# Patient Record
Sex: FEMALE | Race: WHITE | URBAN - METROPOLITAN AREA
[De-identification: names, ages, dates, MRNs, and addresses within clinical notes are randomized per-mention and may not be internally consistent; named-entity substitution may affect disease eponyms.]

---

## 2017-09-24 ENCOUNTER — HOSPITAL ENCOUNTER (EMERGENCY)
Facility: CLINIC | Age: 57
Discharge: HOME | End: 2017-09-24
Attending: EMERGENCY MEDICINE | Admitting: EMERGENCY MEDICINE
Payer: COMMERCIAL

## 2017-09-24 ENCOUNTER — APPOINTMENT (OUTPATIENT)
Dept: GENERAL RADIOLOGY | Facility: CLINIC | Age: 57
End: 2017-09-24
Attending: EMERGENCY MEDICINE
Payer: COMMERCIAL

## 2017-09-24 VITALS
RESPIRATION RATE: 16 BRPM | BODY MASS INDEX: 20.4 KG/M2 | DIASTOLIC BLOOD PRESSURE: 87 MMHG | HEART RATE: 88 BPM | HEIGHT: 67 IN | SYSTOLIC BLOOD PRESSURE: 132 MMHG | WEIGHT: 130 LBS | TEMPERATURE: 97.8 F | OXYGEN SATURATION: 99 %

## 2017-09-24 DIAGNOSIS — S42.292A CLOSED FRACTURE OF HEAD OF LEFT HUMERUS, INITIAL ENCOUNTER: ICD-10-CM
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
## 2017-09-24 DIAGNOSIS — S42.212A: ICD-10-CM

## 2017-09-24 LAB
ALBUMIN SERPL-MCNC: 4.1 G/DL (ref 3.4–5)
ALP SERPL-CCNC: 43 U/L (ref 40–150)
ALT SERPL W P-5'-P-CCNC: 20 U/L (ref 0–50)
ANION GAP SERPL CALCULATED.3IONS-SCNC: 14 MMOL/L (ref 3–14)
AST SERPL W P-5'-P-CCNC: 24 U/L (ref 0–45)
BASOPHILS # BLD AUTO: 0 10E9/L (ref 0–0.2)
BASOPHILS NFR BLD AUTO: 0.2 %
BILIRUB SERPL-MCNC: 0.3 MG/DL (ref 0.2–1.3)
BUN SERPL-MCNC: 8 MG/DL (ref 7–30)
CALCIUM SERPL-MCNC: 8.7 MG/DL (ref 8.5–10.1)
CHLORIDE SERPL-SCNC: 104 MMOL/L (ref 94–109)
CO2 SERPL-SCNC: 23 MMOL/L (ref 20–32)
CREAT SERPL-MCNC: 0.5 MG/DL (ref 0.52–1.04)
DIFFERENTIAL METHOD BLD: ABNORMAL
EOSINOPHIL # BLD AUTO: 0 10E9/L (ref 0–0.7)
EOSINOPHIL NFR BLD AUTO: 0.2 %
ERYTHROCYTE [DISTWIDTH] IN BLOOD BY AUTOMATED COUNT: 12 % (ref 10–15)
ETHANOL SERPL-MCNC: 0.07 G/DL
GFR SERPL CREATININE-BSD FRML MDRD: >90 ML/MIN/1.7M2
GLUCOSE SERPL-MCNC: 94 MG/DL (ref 70–99)
HCT VFR BLD AUTO: 36.9 % (ref 35–47)
HGB BLD-MCNC: 12.8 G/DL (ref 11.7–15.7)
LYMPHOCYTES # BLD AUTO: 0.9 10E9/L (ref 0.8–5.3)
LYMPHOCYTES NFR BLD AUTO: 15.6 %
MCH RBC QN AUTO: 36.4 PG (ref 26.5–33)
MCHC RBC AUTO-ENTMCNC: 34.7 G/DL (ref 31.5–36.5)
MCV RBC AUTO: 105 FL (ref 78–100)
MONOCYTES # BLD AUTO: 0.5 10E9/L (ref 0–1.3)
MONOCYTES NFR BLD AUTO: 8.8 %
NEUTROPHILS # BLD AUTO: 4.2 10E9/L (ref 1.6–8.3)
NEUTROPHILS NFR BLD AUTO: 75.2 %
PLATELET # BLD AUTO: 257 10E9/L (ref 150–450)
POTASSIUM SERPL-SCNC: 4.2 MMOL/L (ref 3.4–5.3)
PROT SERPL-MCNC: 7.5 G/DL (ref 6.8–8.8)
RBC # BLD AUTO: 3.52 10E12/L (ref 3.8–5.2)
SODIUM SERPL-SCNC: 141 MMOL/L (ref 133–144)
WBC # BLD AUTO: 5.6 10E9/L (ref 4–11)

## 2017-09-24 PROCEDURE — 99285 EMERGENCY DEPT VISIT HI MDM: CPT | Mod: 25 | Performed by: EMERGENCY MEDICINE

## 2017-09-24 PROCEDURE — 25000132 ZZH RX MED GY IP 250 OP 250 PS 637: Performed by: EMERGENCY MEDICINE

## 2017-09-24 PROCEDURE — 80053 COMPREHEN METABOLIC PANEL: CPT | Performed by: EMERGENCY MEDICINE

## 2017-09-24 PROCEDURE — 96374 THER/PROPH/DIAG INJ IV PUSH: CPT | Performed by: EMERGENCY MEDICINE

## 2017-09-24 PROCEDURE — 73030 X-RAY EXAM OF SHOULDER: CPT | Mod: TC,LT

## 2017-09-24 PROCEDURE — 96376 TX/PRO/DX INJ SAME DRUG ADON: CPT | Performed by: EMERGENCY MEDICINE

## 2017-09-24 PROCEDURE — 80320 DRUG SCREEN QUANTALCOHOLS: CPT | Performed by: EMERGENCY MEDICINE

## 2017-09-24 PROCEDURE — 29240 STRAPPING OF SHOULDER: CPT | Mod: LT | Performed by: EMERGENCY MEDICINE

## 2017-09-24 PROCEDURE — 85025 COMPLETE CBC W/AUTO DIFF WBC: CPT | Performed by: EMERGENCY MEDICINE

## 2017-09-24 PROCEDURE — 25000128 H RX IP 250 OP 636: Performed by: EMERGENCY MEDICINE

## 2017-09-24 PROCEDURE — 99285 EMERGENCY DEPT VISIT HI MDM: CPT | Mod: Z6 | Performed by: EMERGENCY MEDICINE

## 2017-09-24 RX ORDER — OXYCODONE HYDROCHLORIDE 5 MG/1
5-10 TABLET ORAL EVERY 6 HOURS PRN
Qty: 20 TABLET | Refills: 0 | Status: SHIPPED | OUTPATIENT
Start: 2017-09-24

## 2017-09-24 RX ORDER — HYDROMORPHONE HYDROCHLORIDE 1 MG/ML
0.5 INJECTION, SOLUTION INTRAMUSCULAR; INTRAVENOUS; SUBCUTANEOUS
Status: COMPLETED | OUTPATIENT
Start: 2017-09-24 | End: 2017-09-24

## 2017-09-24 RX ORDER — OXYCODONE HYDROCHLORIDE 5 MG/1
5-10 TABLET ORAL
Status: COMPLETED | OUTPATIENT
Start: 2017-09-24 | End: 2017-09-24

## 2017-09-24 RX ADMIN — OXYCODONE HYDROCHLORIDE 10 MG: 5 TABLET ORAL at 11:27

## 2017-09-24 RX ADMIN — HYDROMORPHONE HYDROCHLORIDE 0.5 MG: 1 INJECTION, SOLUTION INTRAMUSCULAR; INTRAVENOUS; SUBCUTANEOUS at 11:15

## 2017-09-24 RX ADMIN — HYDROMORPHONE HYDROCHLORIDE 0.5 MG: 1 INJECTION, SOLUTION INTRAMUSCULAR; INTRAVENOUS; SUBCUTANEOUS at 10:21

## 2017-09-24 RX ADMIN — HYDROMORPHONE HYDROCHLORIDE 0.5 MG: 1 INJECTION, SOLUTION INTRAMUSCULAR; INTRAVENOUS; SUBCUTANEOUS at 10:02

## 2017-09-24 NOTE — ED NOTES
History of left shoulder surgery.  Stumbled and fell last night.  Denies other injuries.  Hand is a bit tingly

## 2017-09-24 NOTE — ED AVS SNAPSHOT
[facility] Emergency Department    [address] [place] [address]    Phone:  [phone]    Fax:  [phone]                                       Leti Youssef   MRN: [id number]    Department:  [facility] Emergency Department   Date of Visit:  9/24/2017           Patient Information     Date Of Birth          1960        Your diagnoses for this visit were:     Fracture of neck of left humerus, closed, initial encounter        You were seen by Christel Carpio MD.      Follow-up Information     Follow up with Nguyen Tong MD.    Specialty:  Orthopaedic Surgery    Contact information:    [address] [place] [address] [phone]          Discharge Instructions       Sling or shoulder immobilizer for comfort.    Oxycodone if needed for severe pain.    OK to ice for swelling.    Follow-up with Dr. Tong as scheduled.    Return at any time for concerns.    I hope that you heal quickly!!         Discharge References/Attachments     FRACTURE, SHOULDER (ENGLISH)      Future Appointments        Provider Department Dept Phone Center    9/25/2017 1:00 PM Nguyen Tong MD [facility] [phone] [facility]      24 Hour Appointment Hotline       To make an appointment at any [facility], call [phone] ([phone]). If you don't have a family doctor or clinic, we will help you find one. [facility] are conveniently located to serve the needs of you and your family.          ED Discharge Orders     SHOULDER IMMOBILIZER                    Review of your medicines      START taking        Dose / Directions Last dose taken    oxyCODONE 5 MG IR tablet   Commonly known as:  ROXICODONE   Dose:  5-10 mg   Quantity:  20 tablet        Take 1-2 tablets (5-10 mg) by mouth every 6 hours as needed for pain   Refills:  0          Our records show that you are taking the medicines listed below. If these are incorrect, please  call your family doctor or clinic.        Dose / Directions Last dose taken    aspirin 325 MG tablet        1 tablet daily   Refills:  0        COLACE 100 MG capsule   Generic drug:  docusate sodium        1 capsule twice daily as needed for constipation   Refills:  0        metoprolol 10 mg/mL Susp   Commonly known as:  LOPRESSOR   Dose:  37.5 mg        37.5 mg by Per J Tube route. X 10 days   Refills:  0        MILK OF MAGNESIA PO        Take  by mouth See Admin Instructions.   Refills:  0        MIRALAX powder   Dose:  1 capful   Generic drug:  polyethylene glycol        Take 1 capful by mouth See Admin Instructions.   Refills:  0        multivitamin per tablet   Dose:  1 tablet        Take 1 tablet by mouth daily.   Refills:  0        VISTARIL 25 MG capsule   Generic drug:  hydrOXYzine        1 to 2 capsules every 3 hours as needed for restlessness / anxiety   Refills:  0        ZESTRIL 10 MG tablet   Generic drug:  lisinopril        1 tablet daily   Refills:  0                Prescriptions were sent or printed at these locations (1 Prescription)                   Other Prescriptions                Printed at Department/Unit printer (1 of 1)         oxyCODONE (ROXICODONE) 5 MG IR tablet                Procedures and tests performed during your visit     Alcohol level blood    CBC with platelets differential    Comprehensive metabolic panel    Shoulder XR, G/E 3 views, left      Orders Needing Specimen Collection     None      Pending Results     No orders found from 9/22/2017 to 9/25/2017.            Pending Culture Results     No orders found from 9/22/2017 to 9/25/2017.            Pending Results Instructions     If you had any lab results that were not finalized at the time of your Discharge, you can call the ED Lab Result RN at [phone]. You will be contacted by this team for any positive Lab results or changes in treatment. The nurses are available 7 days a week from 10A to 6:30P.  You can leave a  "message 24 hours per day and they will return your call.        Thank you for choosing [facility]       Thank you for choosing [facility] for your care. Our goal is always to provide you with excellent care. Hearing back from our patients is one way we can continue to improve our services. Please take a few minutes to complete the written survey that you may receive in the mail after you visit with us. Thank you!        [facility] Information     [employer] lets you send messages to your doctor, view your test results, renew your prescriptions, schedule appointments and more. To sign up, go to [email or web address] . Click on \"Log in\" on the left side of the screen, which will take you to the Welcome page. Then click on \"Sign up Now\" on the right side of the page.     You will be asked to enter the access code listed below, as well as some personal information. Please follow the directions to create your username and password.     Your access code is: [email or web address]  Expires: 2017 11:24 AM     Your access code will  in 90 days. If you need help or a new code, please call your [facility] clinic or [phone].        Care EveryWhere ID     This is your Care EveryWhere ID. This could be used by other organizations to access your [facility] medical records  [id number]        Equal Access to Services     LUZ MARIA LYNCH AH: Lee boo Soharsh, waaxda luqadaha, qaybta kaalmada adedarlin, melody perrin. So [facility] [phone].    ATENCIÓN: Si habla español, tiene a edgar disposición servicios gratuitos de asistencia lingüística. Llame al [phone].    We comply with applicable federal civil rights laws and [place] laws. We do not discriminate on the basis of race, color, national origin, age, disability sex, sexual orientation or gender identity.            After Visit Summary       This is your record. Keep this with you and show to your community pharmacist(s) and doctor(s) at your " next visit.

## 2017-09-24 NOTE — ED AVS SNAPSHOT
[facility] Emergency Department    [address] [place] [address]    Phone:  [phone]    Fax:  [phone]                                       Leti Youssef   MRN: [id number]    Department:  [facility] Emergency Department   Date of Visit:  9/24/2017           After Visit Summary Signature Page     I have received my discharge instructions, and my questions have been answered. I have discussed any challenges I see with this plan with the nurse or doctor.    ..........................................................................................................................................  Patient/Patient Representative Signature      ..........................................................................................................................................  Patient Representative Print Name and Relationship to Patient    ..................................................               ................................................  Date                                            Time    ..........................................................................................................................................  Reviewed by Signature/Title    ...................................................              ..............................................  Date                                                            Time

## 2017-09-24 NOTE — DISCHARGE INSTRUCTIONS
Sling or shoulder immobilizer for comfort.    Oxycodone if needed for severe pain.    OK to ice for swelling.    Follow-up with Dr. Tong as scheduled.    Return at any time for concerns.    I hope that you heal quickly!!

## 2017-09-25 ENCOUNTER — OFFICE VISIT (OUTPATIENT)
Dept: ORTHOPEDICS | Facility: CLINIC | Age: 57
End: 2017-09-25
Payer: COMMERCIAL

## 2017-09-25 VITALS — BODY MASS INDEX: 20.4 KG/M2 | TEMPERATURE: 97 F | HEIGHT: 67 IN | WEIGHT: 130 LBS

## 2017-09-25 DIAGNOSIS — S42.292D OTHER CLOSED DISPLACED FRACTURE OF PROXIMAL END OF LEFT HUMERUS WITH ROUTINE HEALING, SUBSEQUENT ENCOUNTER: Primary | ICD-10-CM

## 2017-09-25 PROCEDURE — 99203 OFFICE O/P NEW LOW 30 MIN: CPT | Performed by: ORTHOPAEDIC SURGERY

## 2017-09-25 RX ORDER — HYDROXYZINE PAMOATE 25 MG/1
25 CAPSULE ORAL EVERY 8 HOURS PRN
Qty: 60 CAPSULE | Refills: 0 | Status: SHIPPED | OUTPATIENT
Start: 2017-09-25

## 2017-09-25 RX ORDER — OXYCODONE HCL 10 MG/1
10 TABLET, FILM COATED, EXTENDED RELEASE ORAL EVERY 12 HOURS
Qty: 60 TABLET | Refills: 0 | Status: SHIPPED | OUTPATIENT
Start: 2017-09-25

## 2017-09-25 ASSESSMENT — PAIN SCALES - GENERAL: PAINLEVEL: SEVERE PAIN (7)

## 2017-09-25 NOTE — PROGRESS NOTES
ORTHOPEDIC CONSULT      Chief Complaint: Leti Youssef is a 56 year old left hand dominant female who works as a .      She is being seen for   Chief Complaints and History of Present Illnesses   Patient presents with     Consult     ED f/u left shoulder fx doi 9/24/14         History of Present Illness:   Leti Youssef is a 56 year old female who is seen in consultation at the request of ED.  History of Present illness:  Leti presents for evaluation of:  1.) Left shoulder  Onset: 9/23/17  Symptoms brought on by fell on table.   Character:  sharp and dull ache.    Progression of symptoms:  better.    Previous similar pain: YES- broken about 8 yrs ago/surgery - had an ORIF of a humerus shaft fx did well  Pain Level:  7/10.   Previous treatments:  immobilization.   Currently on Blood thinners? no  Diagnosis of Diabetes? No   Pain meds not working well, can't sleep    Patient's past medical, surgical, social and family histories reviewed.     Past Medical History:   Diagnosis Date     Closed fracture of patella 08/10/09    D/C 08/13/09     Closed fracture of shaft of humerus      Esophageal tear 8/23/10    Acute upper GI bleed       Past Surgical History:   Procedure Laterality Date     ENDOSCOPY  8/23/10    Upper GI endoscopy     HC OPEN TREATMENT PROX HUMERAL FRACTURE  08/10/09    open reduction and internal fixation,left humerus       Medications:    Current Outpatient Prescriptions on File Prior to Visit:  oxyCODONE (ROXICODONE) 5 MG IR tablet Take 1-2 tablets (5-10 mg) by mouth every 6 hours as needed for pain   metoprolol (LOPRESSOR) 10 mg/mL 37.5 mg by Per J Tube route. X 10 days   Magnesium Hydroxide (MILK OF MAGNESIA PO) Take  by mouth See Admin Instructions.   polyethylene glycol (MIRALAX) powder Take 1 capful by mouth See Admin Instructions.   Multiple Vitamin (MULTIVITAMIN) per tablet Take 1 tablet by mouth daily.   ASPIRIN 325 MG OR TABS 1 tablet daily    COLACE 100 MG OR CAPS 1  "capsule twice daily as needed for constipation   VISTARIL 25 MG OR CAPS 1 to 2 capsules every 3 hours as needed for restlessness / anxiety   ZESTRIL 10 MG OR TABS 1 tablet daily     No current facility-administered medications on file prior to visit.     No Known Allergies    Social History     Occupational History     Not on file.     Social History Main Topics     Smoking status: Never Smoker     Smokeless tobacco: Never Used     Alcohol use Yes     Drug use: No     Sexual activity: Not on file       No family history on file.    REVIEW OF SYSTEMS  10 point review systems performed otherwise negative as noted as per history of present illness.    Physical Exam:  Vitals: Temp 97  F (36.1  C)  Ht 1.702 m (5' 7\")  Wt 59 kg (130 lb)  BMI 20.36 kg/m2  BMI= Body mass index is 20.36 kg/(m^2).    Constitutional: healthy, alert and no acute distress   Psychiatric: mentation appears normal and affect normal/bright  NEURO: no focal deficits  RESP: Normal with easy respirations and no use of accessory muscles to breathe, no audible wheezing or retractions  CV: regular pulse  SKIN: No erythema, rashes, excoriation, or breakdown. No evidence of infection. , Previous well healed incisions/laceration: anterior arm  JOINT/EXTREMITIES:left shoulder - immobilizer on, skin intact, tenderness and pain globally, grossly NVID  GAIT: non-antalgic  Lymph: no palpable lymph nodes    Diagnostic Modalities:  left shoulder X-ray: displaced proximal humerus surgical neck fx, below that large plate and screws with well-healed shaft fx  Independent visualization of the images was performed.      Impression: left proximal humerus fracture, just proximal to previous plate and screws.  This will require a complex surgery out of the scope of my practice.  I'd like to refer her to the [facility] shoulder specialists, they may seek care at [facility] where her  just had CELESTE.    Plan:  All of the above pertinent physical exam and imaging " modalities findings was reviewed with Leti and her , all questions answered.                                          CONSERVATIVE CARE:  I recommend conservative care for the patient to include narcotic pain medictions, activity modifications, rest, sling, referral to [facility] for surgery. Today I provided or dispensed Oxycodone prescription, vistaril rx, info, note to be OOW.      Return to clinic PRN, or sooner as needed for changes.  Re-x-ray on return: Yes.    gNuyen Tong M.D.

## 2017-09-25 NOTE — ED PROVIDER NOTES
History     Chief Complaint   Patient presents with     Shoulder Pain     The history is provided by the patient and medical records.     This is a 56 year old female with history of hypertension, osteoporosis, ORIF of left humerus and right patellar fracture after a fall in 2009, esophageal tear status post thoracic surgery presenting with shoulder pain. Patient is here with her significant other. They report that she stumbled and fell hitting her left shoulder on a table last night. They noted that it was swollen and were concerned that she may have a break versus dislocation. She presents to the ED in a sling.  She feels a little bit of tingling in her left hand but notes that she is able to move it easily.  She denies any other injuries or complaints including chest pain, shortness of breath, abdominal pain, back pain, neck pain, lower extremity pain. No recent illnesses. Patient was previously on Fosamax but is not currently.    I have reviewed the Medications, Allergies, Past Medical and Surgical History, and Social History in the Epic system.    Allergies: No Known Allergies      No current facility-administered medications on file prior to encounter.   Current Outpatient Prescriptions on File Prior to Encounter:  metoprolol (LOPRESSOR) 10 mg/mL 37.5 mg by Per J Tube route. X 10 days   Magnesium Hydroxide (MILK OF MAGNESIA PO) Take  by mouth See Admin Instructions.   polyethylene glycol (MIRALAX) powder Take 1 capful by mouth See Admin Instructions.   Multiple Vitamin (MULTIVITAMIN) per tablet Take 1 tablet by mouth daily.   ASPIRIN 325 MG OR TABS 1 tablet daily    COLACE 100 MG OR CAPS 1 capsule twice daily as needed for constipation   VISTARIL 25 MG OR CAPS 1 to 2 capsules every 3 hours as needed for restlessness / anxiety   ZESTRIL 10 MG OR TABS 1 tablet daily       There is no problem list on file for this patient.      Past Surgical History:   Procedure Laterality Date     ENDOSCOPY  8/23/10    Upper  "GI endoscopy     HC OPEN TREATMENT PROX HUMERAL FRACTURE  08/10/09    open reduction and internal fixation,left humerus       Social History   Substance Use Topics     Smoking status: Never Smoker     Smokeless tobacco: Never Used     Alcohol use Yes         There is no immunization history on file for this patient.    BMI: Estimated body mass index is 20.36 kg/(m^2) as calculated from the following:    Height as of this encounter: 1.702 m (5' 7\").    Weight as of this encounter: 59 kg (130 lb).      Review of Systems   All other ROS reviewed and are negative or non-contributory except as stated in HPI.     Physical Exam   BP: (!) 135/95  Pulse: 92  Temp: 97.8  F (36.6  C)  Resp: 16  Height: 170.2 cm (5' 7\")  Weight: 59 kg (130 lb)  SpO2: 99 %  Physical Exam   Constitutional: She appears well-developed and well-nourished.   Very thin, older appearing female sitting in the bed   HENT:   Head: Normocephalic and atraumatic.   Nose: Nose normal.   Mouth/Throat: Oropharynx is clear and moist.   Eyes: Conjunctivae and EOM are normal. Pupils are equal, round, and reactive to light.   Neck: Normal range of motion. Neck supple.   No neck pain or tenderness   Cardiovascular: Normal rate, regular rhythm, normal heart sounds and intact distal pulses.    Pulmonary/Chest: Effort normal and breath sounds normal.   Musculoskeletal:   Right knee scar, left lateral upper arm scar.  Left shoulder is swollen with some deformity above the level of the scar. Tender. Normal CMS at the left elbow, wrists, fingers. No chest wall or clavicle tenderness.   Neurological: She is alert. She exhibits normal muscle tone.   Skin: Skin is warm and dry. She is not diaphoretic.   Psychiatric: She has a normal mood and affect. Her behavior is normal.   Vitals reviewed.      ED Course (with Medical Decision Making)    Pt seen and examined by me.  RN and EPIC notes reviewed.      Patient with reported fall and left shoulder injury. IV was placed for pain " medications and x-ray was done.     X-ray results as below show a moderately displaced fracture of the left humeral neck superior to her plate and screws. Hardware does appear intact. I did draw basic labs because of her history of upper GI bleeding. Also, patient seems a smell of some alcohol and her appearance was concerning to me. I did draw an alcohol level which is 0.07.     Results were discussed with the patient at length. I'm going to consult orthopedics. Her pain is tolerable at this point.     I spoke with Dr. Small. He believes she will need to have a complex surgery in order to repair this because of her previous plating. He noted that she could see him in his clinic later this week or follow up in this facility. Patient does not want to be admitted. An appointment was made to see Dr. Tong who is a shoulder specialist in this facility tomorrow.  She was given oxycodone for pain. She was placed in a shoulder immobilizer.     With regards to her alcohol, I did speak at length about the risks of alcohol use and my concerns. She adamantly denies that she was harmed in any way but rather fell. I did offer to get her information regarding alcohol treatment if desired.        Procedures  Results for orders placed or performed during the hospital encounter of 09/24/17   Shoulder XR, G/E 3 views, left    Narrative    XR SHOULDER LT G/E 3 VW 9/24/2017 10:32 AM    HISTORY: Pain.    COMPARISON: None.      Impression    IMPRESSION: Moderately displaced fracture of the left humeral neck,  superior to cortical plate and cortical screws. Hardware is intact.    ARIADNE ESQUIVEL MD   Comprehensive metabolic panel   Result Value Ref Range    Sodium 141 133 - 144 mmol/L    Potassium 4.2 3.4 - 5.3 mmol/L    Chloride 104 94 - 109 mmol/L    Carbon Dioxide 23 20 - 32 mmol/L    Anion Gap 14 3 - 14 mmol/L    Glucose 94 70 - 99 mg/dL    Urea Nitrogen 8 7 - 30 mg/dL    Creatinine 0.50 (L) 0.52 - 1.04 mg/dL    GFR Estimate >90  >60 mL/min/1.7m2    GFR Estimate If Black >90 >60 mL/min/1.7m2    Calcium 8.7 8.5 - 10.1 mg/dL    Bilirubin Total 0.3 0.2 - 1.3 mg/dL    Albumin 4.1 3.4 - 5.0 g/dL    Protein Total 7.5 6.8 - 8.8 g/dL    Alkaline Phosphatase 43 40 - 150 U/L    ALT 20 0 - 50 U/L    AST 24 0 - 45 U/L   CBC with platelets differential   Result Value Ref Range    WBC 5.6 4.0 - 11.0 10e9/L    RBC Count 3.52 (L) 3.8 - 5.2 10e12/L    Hemoglobin 12.8 11.7 - 15.7 g/dL    Hematocrit 36.9 35.0 - 47.0 %     (H) 78 - 100 fl    MCH 36.4 (H) 26.5 - 33.0 pg    MCHC 34.7 31.5 - 36.5 g/dL    RDW 12.0 10.0 - 15.0 %    Platelet Count 257 150 - 450 10e9/L    Diff Method Automated Method     % Neutrophils 75.2 %    % Lymphocytes 15.6 %    % Monocytes 8.8 %    % Eosinophils 0.2 %    % Basophils 0.2 %    Absolute Neutrophil 4.2 1.6 - 8.3 10e9/L    Absolute Lymphocytes 0.9 0.8 - 5.3 10e9/L    Absolute Monocytes 0.5 0.0 - 1.3 10e9/L    Absolute Eosinophils 0.0 0.0 - 0.7 10e9/L    Absolute Basophils 0.0 0.0 - 0.2 10e9/L   Alcohol level blood   Result Value Ref Range    Ethanol g/dL 0.07 (H) <0.01 g/dL        Assessments & Plan   I have reviewed the findings, diagnosis, plan and need for follow up with the patient.    Discharge Medication List as of 9/24/2017 11:24 AM      START taking these medications    Details   oxyCODONE (ROXICODONE) 5 MG IR tablet Take 1-2 tablets (5-10 mg) by mouth every 6 hours as needed for pain, Disp-20 tablet, R-0, Local Print             Final diagnoses:   Fracture of neck of left humerus, closed, initial encounter     Disposition: Patient discharged home with her significant other in stable condition. Plan is for follow-up appointment tomorrow in this facility. Return at any time for concerns.    9/24/2017   [facility] EMERGENCY DEPARTMENT     Christel Carpio MD  09/24/17 [id number]

## 2017-09-25 NOTE — MR AVS SNAPSHOT
After Visit Summary   9/25/2017    Leti Youssef    MRN: [id number]           Patient Information     Date Of Birth          1960        Visit Information        Provider Department      9/25/2017 1:00 PM Nguyen Tong MD [facility] Instructions      Shoulder Fracture  You have a break (fracture) of the shoulder. This may be a small crack in the bone. Or it may be a major break with the broken parts pushed out of position.     If you have only a crack in the bone and no bone fragments are out of place, you will probably be treated with a shoulder immobilizer. This is a special type of sling. Casts are usually not used for this type of fracture. Your bone should heal in 4 to 8 weeks. More serious injuries may need surgery to put the bones back into the correct position for healing.  Home care  Follow these tips to care for yourself at home:    Leave the shoulder immobilizer in place. This will support the injured arm at your side. This is the best position for the bone to heal.    The shoulder immobilizer is adjustable. If it becomes loose, adjust it so that your forearm is level with the ground (horizontal). Your hand should be level with your elbow.    Apply an ice pack to the injured area for 20 minutes every 1 to 2 hours the first day. You can make an ice pack by putting ice cubes in a plastic bag. A bag of frozen peas or something similar works well too. Wrap the bag in a towel before putting it on your shoulder. Continue with ice packs 3 to 4 times a day for the next 2 to 3 days. Then use the ice as needed to relieve pain and swelling.    You may take acetaminophen or ibuprofen to relieve pain, unless another pain medicine was prescribed. If you have chronic liver or kidney disease or ever had a stomach ulcer or gastrointestinal bleeding, talk with your doctor before using these medicines.    Don t take the sling off before your next exam unless you  "were told to do so. Ask if you should move your elbow, wrist, and hand.  Follow-up care  Follow up with your healthcare provider, or as advised.  A shoulder joint will become stiff if left in a sling for too long. Ask your doctor when it is safe to begin range-of-motion exercises.  When to seek medical advice  Call your healthcare provider right away if any of these occur:    Your fingers become swollen, cold, blue, numb, or tingly    Your shoulder or upper arm swells a lot or looks very bruised    The pain in your shoulder gets worse    The splint or immobilizer breaks    You have a fever or chills  Date Last Reviewed: 8/1/2016 2000-2017 [employer]. [address] [place] [address], [place] [address]. All rights reserved. This information is not intended as a substitute for professional medical care. Always follow your healthcare professional's instructions.                Follow-ups after your visit        Who to contact     If you have questions or need follow up information about today's clinic visit or your schedule please contact [facility] directly at [phone].  Normal or non-critical lab and imaging results will be communicated to you by [employer], letter or phone within 4 business days after the clinic has received the results. If you do not hear from us within 7 days, please contact the clinic through [facility] or phone. If you have a critical or abnormal lab result, we will notify you by phone as soon as possible.  Submit refill requests through [employer] or call your pharmacy and they will forward the refill request to us. Please allow 3 business days for your refill to be completed.          Additional Information About Your Visit        [facility] Information     [employer] lets you send messages to your doctor, view your test results, renew your prescriptions, schedule appointments and more. To sign up, go to [email or web address] . Click on \"Log in\" on the left side of the screen, " "which will take you to the Welcome page. Then click on \"Sign up Now\" on the right side of the page.     You will be asked to enter the access code listed below, as well as some personal information. Please follow the directions to create your username and password.     Your access code is: [email or web address]  Expires: 2017 11:24 AM     Your access code will  in 90 days. If you need help or a new code, please call your [facility] clinic or [phone].        Care EveryWhere ID     This is your Care EveryWhere ID. This could be used by other organizations to access your [facility] medical records  [id number]        Your Vitals Were     Temperature Height BMI (Body Mass Index)             97  F (36.1  C) 1.702 m (5' 7\") 20.36 kg/m2          Blood Pressure from Last 3 Encounters:   17 132/87    Weight from Last 3 Encounters:   17 59 kg (130 lb)   17 59 kg (130 lb)              Today, you had the following     No orders found for display       Primary Care Provider    Physician No Ref-Primary       No address on file        Equal Access to Services     [facility]: Hadii aad ku hadasho Soharsh, waaxda luqadaha, qaybta kaalmada adejennyyalizeth, melody walker . [facility] [phone].    ATENCIÓN: Si habla español, tiene a edgar disposición servicios gratuitos de asistencia lingüística. Llame al [phone].    We comply with applicable federal civil rights laws and [place] laws. We do not discriminate on the basis of race, color, national origin, age, disability sex, sexual orientation or gender identity.            Thank you!     Thank you for choosing [facility]  for your care. Our goal is always to provide you with excellent care. Hearing back from our patients is one way we can continue to improve our services. Please take a few minutes to complete the written survey that you may receive in the mail after your visit with us. Thank you!             Your " Updated Medication List - Protect others around you: Learn how to safely use, store and throw away your medicines at www.disposemymeds.org.          This list is accurate as of: 9/25/17  2:09 PM.  Always use your most recent med list.                   Brand Name Dispense Instructions for use Diagnosis    aspirin 325 MG tablet      1 tablet daily        COLACE 100 MG capsule   Generic drug:  docusate sodium      1 capsule twice daily as needed for constipation        metoprolol 10 mg/mL Susp    LOPRESSOR     37.5 mg by Per J Tube route. X 10 days        MILK OF MAGNESIA PO      Take  by mouth See Admin Instructions.        MIRALAX powder   Generic drug:  polyethylene glycol      Take 1 capful by mouth See Admin Instructions.        multivitamin per tablet      Take 1 tablet by mouth daily.        oxyCODONE 5 MG IR tablet    ROXICODONE    20 tablet    Take 1-2 tablets (5-10 mg) by mouth every 6 hours as needed for pain        VISTARIL 25 MG capsule   Generic drug:  hydrOXYzine      1 to 2 capsules every 3 hours as needed for restlessness / anxiety        ZESTRIL 10 MG tablet   Generic drug:  lisinopril      1 tablet daily

## 2017-09-25 NOTE — NURSING NOTE
"Chief Complaint   Patient presents with     Consult     ED f/u left shoulder fx doi 9/24/14       Initial Temp 97  F (36.1  C)  Ht 1.702 m (5' 7\")  Wt 59 kg (130 lb)  BMI 20.36 kg/m2 Estimated body mass index is 20.36 kg/(m^2) as calculated from the following:    Height as of this encounter: 1.702 m (5' 7\").    Weight as of this encounter: 59 kg (130 lb).  Medication Reconciliation: complete    BP completed using cuff size: NA (Not Taken)    Linda Tiwari MA      "

## 2017-09-25 NOTE — PATIENT INSTRUCTIONS
Shoulder Fracture  You have a break (fracture) of the shoulder. This may be a small crack in the bone. Or it may be a major break with the broken parts pushed out of position.     If you have only a crack in the bone and no bone fragments are out of place, you will probably be treated with a shoulder immobilizer. This is a special type of sling. Casts are usually not used for this type of fracture. Your bone should heal in 4 to 8 weeks. More serious injuries may need surgery to put the bones back into the correct position for healing.  Home care  Follow these tips to care for yourself at home:    Leave the shoulder immobilizer in place. This will support the injured arm at your side. This is the best position for the bone to heal.    The shoulder immobilizer is adjustable. If it becomes loose, adjust it so that your forearm is level with the ground (horizontal). Your hand should be level with your elbow.    Apply an ice pack to the injured area for 20 minutes every 1 to 2 hours the first day. You can make an ice pack by putting ice cubes in a plastic bag. A bag of frozen peas or something similar works well too. Wrap the bag in a towel before putting it on your shoulder. Continue with ice packs 3 to 4 times a day for the next 2 to 3 days. Then use the ice as needed to relieve pain and swelling.    You may take acetaminophen or ibuprofen to relieve pain, unless another pain medicine was prescribed. If you have chronic liver or kidney disease or ever had a stomach ulcer or gastrointestinal bleeding, talk with your doctor before using these medicines.    Don t take the sling off before your next exam unless you were told to do so. Ask if you should move your elbow, wrist, and hand.  Follow-up care  Follow up with your healthcare provider, or as advised.  A shoulder joint will become stiff if left in a sling for too long. Ask your doctor when it is safe to begin range-of-motion exercises.  When to seek medical  advice  Call your healthcare provider right away if any of these occur:    Your fingers become swollen, cold, blue, numb, or tingly    Your shoulder or upper arm swells a lot or looks very bruised    The pain in your shoulder gets worse    The splint or immobilizer breaks    You have a fever or chills  Date Last Reviewed: 8/1/2016 2000-2017 [employer]. [address] [place] [address]. All rights reserved. This information is not intended as a substitute for professional medical care. Always follow your healthcare professional's instructions.

## 2017-09-25 NOTE — LETTER
[address] [address]  Phone: [phone]  Fax: [phone]    September 25, 2017        Leti Youssef  [address] [place] [id number]          To whom it may concern:    RE: Leti Youssef    Patient was seen and treated today at our clinic.  Please excuse her from work until further notice from MD.    Please contact me for questions or concerns.      Sincerely,        Nguyen Tong MD

## 2017-09-26 ENCOUNTER — TELEPHONE (OUTPATIENT)
Dept: ORTHOPEDICS | Facility: CLINIC | Age: 57
End: 2017-09-26

## 2017-09-26 DIAGNOSIS — S42.292D OTHER CLOSED DISPLACED FRACTURE OF PROXIMAL END OF LEFT HUMERUS WITH ROUTINE HEALING, SUBSEQUENT ENCOUNTER: Primary | ICD-10-CM

## 2017-09-26 RX ORDER — OXYCODONE HYDROCHLORIDE 10 MG/1
TABLET ORAL
Qty: 60 TABLET | Refills: 0 | Status: SHIPPED | OUTPATIENT
Start: 2017-09-26

## 2017-09-26 NOTE — TELEPHONE ENCOUNTER
I called and spoke with a pharmacist at [facility] in [place]. Pt does not have insurance.   Pt had gotten RX from ED for Roxicodone 5mg IR, #20 tabs, she did get this filled and paid.  Yesterday an RX for Oxycontin 10mg was written. Pt did NOT get the filled due to the cost.  Pharmacist states that if an RX for generic MS contin 15mg, #60 would be $60. Pharmacist believes this is what the pt's message is referring to. JACKIE Rodriguez

## 2017-09-26 NOTE — TELEPHONE ENCOUNTER
Reason for Call:  Other prescription    Detailed comments: Patient was seen yesterday and is requesting a change in the oxycodone script.  The cost is $200 for the oxycodone 200mg and the pharmacist told her if it was changed to 165mg it would be much more affordable.  Please consider adjusting the script (uses [facility] in [place])     Phone Number Patient can be reached at: Home number on file [phone] (home)    Best Time: any    Can we leave a detailed message on this number? YES    Call taken on 9/26/2017 at 9:17 AM by Garima Roth

## 2017-09-26 NOTE — TELEPHONE ENCOUNTER
Order is in. I called the pharmacist and he thought this would be good for her money szymanski. JACKIE Rodriguez

## 2017-09-26 NOTE — TELEPHONE ENCOUNTER
PT has been notified of readiness. Pt's SO, Moose WOLF, will  at the specialty check in desk in [place]. JACKIE Rodriguez

## 2019-11-08 ENCOUNTER — APPOINTMENT (OUTPATIENT)
Age: 59
Setting detail: DERMATOLOGY
End: 2019-11-12

## 2019-11-08 DIAGNOSIS — L57.8 OTHER SKIN CHANGES DUE TO CHRONIC EXPOSURE TO NONIONIZING RADIATION: ICD-10-CM

## 2019-11-08 DIAGNOSIS — L57.0 ACTINIC KERATOSIS: ICD-10-CM

## 2019-11-08 PROBLEM — D48.5 NEOPLASM OF UNCERTAIN BEHAVIOR OF SKIN: Status: ACTIVE | Noted: 2019-11-08

## 2019-11-08 PROCEDURE — 88305 TISSUE EXAM BY PATHOLOGIST: CPT

## 2019-11-08 PROCEDURE — OTHER PATHOLOGY BILLING: OTHER

## 2019-11-08 PROCEDURE — OTHER COUNSELING: OTHER

## 2019-11-08 PROCEDURE — 11102 TANGNTL BX SKIN SINGLE LES: CPT

## 2019-11-08 PROCEDURE — 99213 OFFICE O/P EST LOW 20 MIN: CPT | Mod: 25

## 2019-11-08 PROCEDURE — OTHER BIOPSY BY SHAVE METHOD: OTHER

## 2019-11-08 ASSESSMENT — LOCATION ZONE DERM
LOCATION ZONE: TRUNK
LOCATION ZONE: FACE

## 2019-11-08 ASSESSMENT — LOCATION DETAILED DESCRIPTION DERM
LOCATION DETAILED: UPPER STERNUM
LOCATION DETAILED: LEFT INFERIOR CENTRAL MALAR CHEEK

## 2019-11-08 ASSESSMENT — LOCATION SIMPLE DESCRIPTION DERM
LOCATION SIMPLE: CHEST
LOCATION SIMPLE: LEFT CHEEK

## 2019-11-08 NOTE — PROCEDURE: PATHOLOGY BILLING
Immunohistochemistry (52579 and 75916) billing is not performed here. Please use the Immunohistochemistry Stain Billing plan to accomplish this. Immunohistochemistry (67577 and 98814) billing is not performed here. Please use the Immunohistochemistry Stain Billing plan to accomplish this.

## 2019-11-08 NOTE — HPI: FOLLOW UP OTHER
What Is Your Reason For Requesting A Follow-Up Appointment?: Patient had a biopsy on 9/23/19 that showed a hypertrophic actinic keratosis on left cheek. She then had an appointment for LN2 on 10/4/19. She states it doesn’t seem to be healing well. It’s inflamed and tender

## 2019-12-02 ENCOUNTER — APPOINTMENT (OUTPATIENT)
Age: 59
Setting detail: DERMATOLOGY
End: 2019-12-02

## 2019-12-02 DIAGNOSIS — L57.8 OTHER SKIN CHANGES DUE TO CHRONIC EXPOSURE TO NONIONIZING RADIATION: ICD-10-CM

## 2019-12-02 PROBLEM — C44.329 SQUAMOUS CELL CARCINOMA OF SKIN OF OTHER PARTS OF FACE: Status: ACTIVE | Noted: 2019-12-02

## 2019-12-02 PROCEDURE — 99213 OFFICE O/P EST LOW 20 MIN: CPT | Mod: 25

## 2019-12-02 PROCEDURE — 17311 MOHS 1 STAGE H/N/HF/G: CPT

## 2019-12-02 PROCEDURE — OTHER COUNSELING: OTHER

## 2019-12-02 PROCEDURE — OTHER MOHS BY LAYER: OTHER

## 2019-12-02 PROCEDURE — 13132 CMPLX RPR F/C/C/M/N/AX/G/H/F: CPT

## 2019-12-02 ASSESSMENT — LOCATION SIMPLE DESCRIPTION DERM: LOCATION SIMPLE: SUPERIOR FOREHEAD

## 2019-12-02 ASSESSMENT — LOCATION ZONE DERM: LOCATION ZONE: FACE

## 2019-12-02 ASSESSMENT — LOCATION DETAILED DESCRIPTION DERM: LOCATION DETAILED: SUPERIOR MID FOREHEAD

## 2019-12-02 NOTE — PROCEDURE: MOHS BY LAYER
Primary Defect Length (In Cm): 1.2
Epidermal Sutures: 6-0 Nylon
Estimated Blood Loss (Cc): minimal
Stage 6: Number Of Sections (Blocks) ?: 0
Simple / Intermediate / Complex Repair - Final Wound Length In Cm: 2.6
Suture Removal: 7 days
Deep Sutures: 5-0 PolySyn
Body Location Override (Optional - Billing Will Still Be Based On Selected Body Map Location If Applicable): left cheek
Number Of Stages: 1
Post-Care Instructions: I reviewed with the patient in detail post-care instructions. Patient is not to engage in any heavy lifting, exercise, or swimming for the next 14 days. Should the patient develop any fevers, chills, bleeding, severe pain patient will contact the office immediately.
Size Of Lesion: 0.8
Hemostasis: Electrocautery
Anesthesia Type: 1% lidocaine with epinephrine
Consent: The rationale for Mohs was explained to the patient and consent was obtained. The risks, benefits and alternatives to therapy were discussed in detail. Specifically, the risks of infection, scarring, bleeding, prolonged wound healing, incomplete removal, allergy to anesthesia, nerve injury and recurrence were addressed. Prior to the procedure, the treatment site was clearly identified and confirmed by the patient. All components of Universal Protocol/PAUSE Rule completed.
Epidermal Closure: simple interrupted
Bill For Surgical Tray: no
Detail Level: Detailed
Stage 1: Depth Of Layer: dermis
Repair Type: Complex Repair
Wound Care: Petrolatum

## 2019-12-10 ENCOUNTER — APPOINTMENT (OUTPATIENT)
Age: 59
Setting detail: DERMATOLOGY
End: 2019-12-10

## 2019-12-10 PROBLEM — C44.329 SQUAMOUS CELL CARCINOMA OF SKIN OF OTHER PARTS OF FACE: Status: ACTIVE | Noted: 2019-12-10

## 2019-12-10 PROCEDURE — OTHER SUTURE REMOVAL (GLOBAL PERIOD): OTHER

## 2019-12-10 NOTE — PROCEDURE: SUTURE REMOVAL (GLOBAL PERIOD)
Add 19812 Cpt? (Important Note: In 2017 The Use Of 77175 Is Being Tracked By Cms To Determine Future Global Period Reimbursement For Global Periods): no
Detail Level: Detailed